# Patient Record
Sex: FEMALE | Race: WHITE | Employment: PART TIME | URBAN - NONMETROPOLITAN AREA
[De-identification: names, ages, dates, MRNs, and addresses within clinical notes are randomized per-mention and may not be internally consistent; named-entity substitution may affect disease eponyms.]

---

## 2017-08-24 ENCOUNTER — HOSPITAL ENCOUNTER (EMERGENCY)
Age: 42
Discharge: HOME | End: 2017-08-24
Attending: EMERGENCY MEDICINE
Payer: COMMERCIAL

## 2017-08-24 VITALS
RESPIRATION RATE: 16 BRPM | DIASTOLIC BLOOD PRESSURE: 66 MMHG | HEART RATE: 98 BPM | SYSTOLIC BLOOD PRESSURE: 139 MMHG | TEMPERATURE: 98.8 F | OXYGEN SATURATION: 99 %

## 2017-08-24 DIAGNOSIS — M54.10 RADICULOPATHY, UNSPECIFIED SPINAL REGION: Primary | ICD-10-CM

## 2017-08-24 PROCEDURE — 99283 EMERGENCY DEPT VISIT LOW MDM: CPT

## 2017-08-24 RX ORDER — TIZANIDINE 4 MG/1
4 TABLET ORAL EVERY 6 HOURS PRN
COMMUNITY

## 2017-08-24 RX ORDER — OXYCODONE AND ACETAMINOPHEN 10; 325 MG/1; MG/1
1 TABLET ORAL EVERY 6 HOURS PRN
COMMUNITY

## 2017-08-24 ASSESSMENT — PAIN - FUNCTIONAL ASSESSMENT: PAIN_FUNCTIONAL_ASSESSMENT: 0-10

## 2017-08-24 ASSESSMENT — PAIN DESCRIPTION - DIRECTION: RADIATING_TOWARDS: RADIATES DOWN LEFT ARM

## 2017-08-24 ASSESSMENT — PAIN DESCRIPTION - FREQUENCY: FREQUENCY: CONTINUOUS

## 2017-08-24 ASSESSMENT — PAIN DESCRIPTION - PROGRESSION: CLINICAL_PROGRESSION: GRADUALLY WORSENING

## 2017-08-24 ASSESSMENT — PAIN DESCRIPTION - ONSET: ONSET: ON-GOING

## 2017-08-24 ASSESSMENT — PAIN DESCRIPTION - PAIN TYPE
TYPE: ACUTE PAIN
TYPE: ACUTE PAIN

## 2017-08-24 ASSESSMENT — PAIN SCALES - GENERAL
PAINLEVEL_OUTOF10: 6
PAINLEVEL_OUTOF10: 7

## 2017-08-24 ASSESSMENT — PAIN DESCRIPTION - DESCRIPTORS: DESCRIPTORS: SHARP;DULL

## 2017-08-24 ASSESSMENT — PAIN DESCRIPTION - LOCATION: LOCATION: ARM

## 2017-08-24 ASSESSMENT — PAIN DESCRIPTION - ORIENTATION: ORIENTATION: LEFT

## 2018-08-21 ENCOUNTER — HOSPITAL ENCOUNTER (OUTPATIENT)
Dept: PHYSICAL THERAPY | Age: 43
Setting detail: THERAPIES SERIES
Discharge: HOME | End: 2018-08-21
Payer: COMMERCIAL

## 2018-08-21 PROCEDURE — 97110 THERAPEUTIC EXERCISES: CPT

## 2018-08-21 PROCEDURE — 97161 PT EVAL LOW COMPLEX 20 MIN: CPT

## 2018-08-21 ASSESSMENT — PAIN SCALES - GENERAL: PAINLEVEL_OUTOF10: 4

## 2018-08-21 NOTE — PROGRESS NOTES
Phone: [phone]         Fax: [phone]                      Outpatient Physical Therapy                                                                      Evaluation    Date: 2018  Patient: Niles Castillo  : 1975  ACCT #: [de-identified]    Referring Practitioner: Dr. Denisha Amaro    Referral Date : 18    Diagnosis: Lumbago, Lumbosacral Radiculopathy    Treatment Diagnosis: Gait ataxia due to Back pain  Onset Date: 18  PT Insurance Information: 30V  Total # of Visits Approved: 9 Per Physician Order  Total # of Visits to Date: 1  No Show: 0  Canceled Appointment: 0     Subjective  Additional Pertinent Hx: Pt has had back pain her entire life. Pt reports in the last 7-8years her pain has significantly worsened. Pt reports prior to changing her pain meds, her average pain was 7-8/10. Since her dosage of pain meds was changed this last visit her pain is averaging 4/10. Pt reports most recent imaging was approx 5/yrs ago. Pt receives injections every 3months. Pt reports her last injection, she had rash/itch/drainage. Pt reports pain is constant in the lumbar and radiates down B/L LEs R more than the L. Pt reports caring for her dtr(handicapped- requires assistance and she is larger than pt). Pt reports when providing care for her dtr her pain is midback bra line. 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 Reaching and standing at work but this does not increase her pain. Sitting increases her pain. Sleeping is also difficult, 4-5hours max. Pt reports sig pain when she wakes up. Pt has new mattress from 2017, pt reports she is a sidesleeper, has tried pillow between the knees with no noted relief. Pt attempts to lay flat every morning to stretch out. Morning pain is the worst, nocturnal pain is elevated.   Pt reports sitting is the worst.  Pain Screening  Patient Currently in Pain: Yes  Pain

## 2018-08-21 NOTE — PLAN OF CARE
[facility] [place]       Phone: [phone]   Date: 2018                      Outpatient Physical Therapy  Fax: [phone] #: [de-identified]                     Plan of Care  [facility]#: [id number]  Patient: Elayne Gage  : 1975    Referring Practitioner: Dr. Leonora Madera    Referral Date : 18    Diagnosis: Lumbago, Lumbosacral Radiculopathy  Onset Date: 18  Treatment Diagnosis: Gait ataxia due to Back pain    Assessment  Body structures, Functions, Activity limitations: Decreased functional mobility , Decreased ADL status, Decreased ROM, Decreased strength, Decreased endurance, Decreased high-level IADLs  Assessment: Pt to benefit from aquatic therapy for hip and core strengthening as well as thoracic and lumbar ROM. Pt issued HEP for improved ROM also. Pt reports she is unable to attend multiple visits per week due to her work/personal life. Pt plans to attend 1, possibly 2 visits per week. Prognosis: Guarded    Treatment Plan   Days: 1 (1-2) times per week Weeks: 8 weeks Total # of Visits Approved: 9  [] HP/CP     [] Electrical Stimulation   [x]  Therapeutic Exercise   []  Gait Training  [] Traction   [] Ultrasound                   []  Massage                        []  Work Conditioning   [x] Aquatics  [x] Back Education            []  Therapeutic Activity []  Manual Therapy  [x]  Patient Education/HEP []  Anodyne Therapy     Goals  Short term goals  Time Frame for Short term goals: 3 visits  Short term goal 1: Pt to report independence and compliance with HEP. Long term goals  Time Frame for Long term goals : 9 visits  Long term goal 1: Pt to report worst pain 3/10 following her workday to improve sleep candy. Long term goal 2: Pt to have 75% trunk rotation B/L to improve candy to driving and work. Long term goal 3: Pt to have 4+/5 Horiz ABD to improve pt candy to ADLs and work duties.   Long term goal 4: Pt to report no radicular pain x3 consecutive days to

## 2018-08-28 ENCOUNTER — APPOINTMENT (OUTPATIENT)
Dept: PHYSICAL THERAPY | Age: 43
End: 2018-08-28
Payer: COMMERCIAL

## 2018-08-31 ENCOUNTER — HOSPITAL ENCOUNTER (OUTPATIENT)
Dept: PHYSICAL THERAPY | Age: 43
Setting detail: THERAPIES SERIES
Discharge: HOME | End: 2018-08-31
Payer: COMMERCIAL

## 2018-08-31 PROCEDURE — 97113 AQUATIC THERAPY/EXERCISES: CPT

## 2018-09-04 ENCOUNTER — HOSPITAL ENCOUNTER (OUTPATIENT)
Dept: PHYSICAL THERAPY | Age: 43
Setting detail: THERAPIES SERIES
Discharge: HOME | End: 2018-09-04
Payer: COMMERCIAL

## 2018-09-04 PROCEDURE — 97113 AQUATIC THERAPY/EXERCISES: CPT

## 2018-09-04 ASSESSMENT — PAIN SCALES - GENERAL: PAINLEVEL_OUTOF10: 5

## 2018-09-18 ENCOUNTER — HOSPITAL ENCOUNTER (OUTPATIENT)
Dept: PHYSICAL THERAPY | Age: 43
Setting detail: THERAPIES SERIES
Discharge: HOME | End: 2018-09-18
Payer: COMMERCIAL

## 2018-09-18 PROCEDURE — 97113 AQUATIC THERAPY/EXERCISES: CPT

## 2018-09-18 ASSESSMENT — PAIN SCALES - GENERAL: PAINLEVEL_OUTOF10: 6

## 2018-09-18 NOTE — PROGRESS NOTES
Phone: Luis Gonzalez      Fax: [phone]                            Outpatient Physical Therapy                                                                            Daily Note    Date: 2018  Patient Name: Nkechi Bains        MRN: [id number]   ACCT#:  [de-identified]  : 1975  (44 y.o.)    Referring Practitioner: Dr. Karissa Mccarthy    Referral Date : 18    Diagnosis: Lumbago, Lumbosacral Radiculopathy  Treatment Diagnosis: Gait ataxia due to Back pain    Onset Date: 18  PT Insurance Information: 30V  Total # of Visits Approved: 9 Per Physician Order  Total # of Visits to Date: 4  No Show: 0  Canceled Appointment: 0    Pre-Treatment Pain:  6/10     Assessment  Assessment: Pt reports compliance to HEP. She reports bathing her disabled daughter  last night leading to increaed pain this a.m. Pt with limited time today  therefore did not sit at jet. She did not wish to schedule for next week stating she will call to schedule. Discussed importance of consistency. Chart Reviewed: Yes    Plan  Plan: Continue with current plan    Exercises/Modalities/Manual:  See DocFlow Sheet          Goals  (Total # of Visits to Date: 4)   Short Term Goals - Time Frame for Short term goals: 3 visits  Short term goal 1: Pt to report independence and compliance with HEP. -met       Long Term Goals - Time Frame for Long term goals : 9 visits  Long term goal 1: Pt to report worst pain 3/10 following her workday to improve sleep candy. Long term goal 2: Pt to have 75% trunk rotation B/L to improve candy to driving and work. Long term goal 3: Pt to have 4+/5 Horiz ABD to improve pt candy to ADLs and work duties. Long term goal 4: Pt to report no radicular pain x3 consecutive days to improve sleep.        Post Treatment Pain:  6/10    Time In: 7708    Time Out : 0942        Timed Code Treatment Minutes: 35 Minutes  Total Treatment Time: 263 [facility] License Number: [id number]    Date: 9/18/2018